# Patient Record
Sex: FEMALE | Race: WHITE
[De-identification: names, ages, dates, MRNs, and addresses within clinical notes are randomized per-mention and may not be internally consistent; named-entity substitution may affect disease eponyms.]

---

## 2017-12-17 ENCOUNTER — HOSPITAL ENCOUNTER (EMERGENCY)
Dept: HOSPITAL 17 - PHED | Age: 32
Discharge: HOME | End: 2017-12-17
Payer: COMMERCIAL

## 2017-12-17 VITALS — BODY MASS INDEX: 42.56 KG/M2 | HEIGHT: 67 IN | WEIGHT: 271.17 LBS

## 2017-12-17 VITALS
TEMPERATURE: 98.2 F | OXYGEN SATURATION: 99 % | SYSTOLIC BLOOD PRESSURE: 136 MMHG | DIASTOLIC BLOOD PRESSURE: 76 MMHG | HEART RATE: 106 BPM | RESPIRATION RATE: 16 BRPM

## 2017-12-17 VITALS — RESPIRATION RATE: 16 BRPM | OXYGEN SATURATION: 98 % | HEART RATE: 93 BPM

## 2017-12-17 VITALS
DIASTOLIC BLOOD PRESSURE: 75 MMHG | HEART RATE: 97 BPM | RESPIRATION RATE: 20 BRPM | OXYGEN SATURATION: 98 % | SYSTOLIC BLOOD PRESSURE: 138 MMHG

## 2017-12-17 VITALS — SYSTOLIC BLOOD PRESSURE: 141 MMHG | DIASTOLIC BLOOD PRESSURE: 66 MMHG

## 2017-12-17 DIAGNOSIS — F17.200: ICD-10-CM

## 2017-12-17 DIAGNOSIS — D72.829: ICD-10-CM

## 2017-12-17 DIAGNOSIS — K57.32: Primary | ICD-10-CM

## 2017-12-17 DIAGNOSIS — B96.20: ICD-10-CM

## 2017-12-17 DIAGNOSIS — R82.99: ICD-10-CM

## 2017-12-17 LAB
ALBUMIN SERPL-MCNC: 3.1 GM/DL (ref 3.4–5)
ALP SERPL-CCNC: 64 U/L (ref 45–117)
ALT SERPL-CCNC: 26 U/L (ref 10–53)
AST SERPL-CCNC: 19 U/L (ref 15–37)
BACTERIA #/AREA URNS HPF: (no result) /HPF
BASOPHILS # BLD AUTO: 0.4 TH/MM3 (ref 0–0.2)
BASOPHILS NFR BLD: 3.5 % (ref 0–2)
BILIRUB SERPL-MCNC: 0.5 MG/DL (ref 0.2–1)
BUN SERPL-MCNC: 12 MG/DL (ref 7–18)
CALCIUM SERPL-MCNC: 7.8 MG/DL (ref 8.5–10.1)
CHLORIDE SERPL-SCNC: 107 MEQ/L (ref 98–107)
COLOR UR: YELLOW
CREAT SERPL-MCNC: 0.97 MG/DL (ref 0.5–1)
EOSINOPHIL # BLD: 0.2 TH/MM3 (ref 0–0.4)
EOSINOPHIL NFR BLD: 1.4 % (ref 0–4)
ERYTHROCYTE [DISTWIDTH] IN BLOOD BY AUTOMATED COUNT: 14.8 % (ref 11.6–17.2)
GFR SERPLBLD BASED ON 1.73 SQ M-ARVRAT: 67 ML/MIN (ref 89–?)
GLUCOSE SERPL-MCNC: 127 MG/DL (ref 74–106)
GLUCOSE UR STRIP-MCNC: (no result) MG/DL
HCO3 BLD-SCNC: 25.8 MEQ/L (ref 21–32)
HCT VFR BLD CALC: 37.9 % (ref 35–46)
HGB BLD-MCNC: 12.9 GM/DL (ref 11.6–15.3)
HGB UR QL STRIP: (no result)
KETONES UR STRIP-MCNC: (no result) MG/DL
LEUKOCYTE ESTERASE UR QL STRIP: (no result) /HPF (ref 0–5)
LIPASE: 127 U/L (ref 73–393)
LYMPHOCYTES # BLD AUTO: 2.9 TH/MM3 (ref 1–4.8)
LYMPHOCYTES NFR BLD AUTO: 24.2 % (ref 9–44)
MCH RBC QN AUTO: 28.5 PG (ref 27–34)
MCHC RBC AUTO-ENTMCNC: 34.1 % (ref 32–36)
MCV RBC AUTO: 83.6 FL (ref 80–100)
MONOCYTE #: 0.8 TH/MM3 (ref 0–0.9)
MONOCYTES NFR BLD: 6.3 % (ref 0–8)
MUCOUS THREADS #/AREA URNS LPF: (no result) /LPF
NEUTROPHILS # BLD AUTO: 7.8 TH/MM3 (ref 1.8–7.7)
NEUTROPHILS NFR BLD AUTO: 64.6 % (ref 16–70)
NITRITE UR QL STRIP: (no result)
PLATELET # BLD: 245 TH/MM3 (ref 150–450)
PMV BLD AUTO: 9.5 FL (ref 7–11)
PROT SERPL-MCNC: 7 GM/DL (ref 6.4–8.2)
RBC # BLD AUTO: 4.53 MIL/MM3 (ref 4–5.3)
SODIUM SERPL-SCNC: 140 MEQ/L (ref 136–145)
SP GR UR STRIP: (no result) (ref 1–1.03)
SQUAMOUS #/AREA URNS HPF: (no result) /HPF (ref 0–5)
URINE LEUKOCYTE ESTERASE: (no result)
WBC # BLD AUTO: 12.1 TH/MM3 (ref 4–11)

## 2017-12-17 PROCEDURE — 83690 ASSAY OF LIPASE: CPT

## 2017-12-17 PROCEDURE — 81001 URINALYSIS AUTO W/SCOPE: CPT

## 2017-12-17 PROCEDURE — 96374 THER/PROPH/DIAG INJ IV PUSH: CPT

## 2017-12-17 PROCEDURE — 96375 TX/PRO/DX INJ NEW DRUG ADDON: CPT

## 2017-12-17 PROCEDURE — 84703 CHORIONIC GONADOTROPIN ASSAY: CPT

## 2017-12-17 PROCEDURE — 74176 CT ABD & PELVIS W/O CONTRAST: CPT

## 2017-12-17 PROCEDURE — 80053 COMPREHEN METABOLIC PANEL: CPT

## 2017-12-17 PROCEDURE — 99285 EMERGENCY DEPT VISIT HI MDM: CPT

## 2017-12-17 PROCEDURE — 87186 SC STD MICRODIL/AGAR DIL: CPT

## 2017-12-17 PROCEDURE — 87077 CULTURE AEROBIC IDENTIFY: CPT

## 2017-12-17 PROCEDURE — 87086 URINE CULTURE/COLONY COUNT: CPT

## 2017-12-17 PROCEDURE — 96361 HYDRATE IV INFUSION ADD-ON: CPT

## 2017-12-17 PROCEDURE — 85025 COMPLETE CBC W/AUTO DIFF WBC: CPT

## 2017-12-17 NOTE — RADRPT
EXAM DATE/TIME:  2017 20:07 

 

[facility] COMPARISON:     

No previous studies available for comparison.

 

 

INDICATIONS :     

Left upper quadrant pain.

                  

 

ORAL CONTRAST:      

No oral contrast ingested.

                  

 

RADIATION DOSE:     

19.31 CTDIvol (mGy) ; Patient motion

 

 

MEDICAL HISTORY :     

None  

 

SURGICAL HISTORY :      

Tubal ligation. Inguinal hernia repair. section.

 

ENCOUNTER:      

Initial

 

ACUITY:      

3 days

 

PAIN SCALE:      

3/10

 

LOCATION:       

Left upper quadrant 

 

TECHNIQUE:     

Volumetric scanning of the abdomen and pelvis was performed.  Using automated exposure control and ad
justment of the mA and/or kV according to patient size, radiation dose was kept as low as reasonably 
achievable to obtain optimal diagnostic quality images.  DICOM format image data is available electro
nically for review and comparison.  

 

FINDINGS:     

 

LOWER LUNGS:     

The visualized lower lungs are clear.

 

LIVER:     

Homogeneous density without lesion.  There is no dilation of the biliary tree.  No calcified gallston
es.

 

SPLEEN:     

Normal size without lesion.

 

PANCREAS:     

Within normal limits. 

 

KIDNEYS:     

Normal in size and shape.  There is no mass, stone, or hydronephrosis.

 

ADRENAL GLANDS:     

Within normal limits.

 

VASCULAR:     

There is no aortic aneurysm.

 

BOWEL/MESENTERY:     

There are colonic diverticula in the sigmoid region and descending colon. There some mild surrounding
 induration in the proximal sigmoid colon concerning for diverticulitis.

 

ABDOMINAL WALL:     

Within normal limits.

 

RETROPERITONEUM:     

There is no lymphadenopathy.

 

BLADDER:     

No wall thickening or mass.

 

REPRODUCTIVE:     

Within normal limits.

 

INGUINAL:     

There is no lymphadenopathy or hernia.

 

MUSCULOSKELETAL:     

Within normal limits for patient age.

 

CONCLUSION:     

Suspected mild diverticulitis at the proximal sigmoid colon in the left lower quadrant.

 

 

 

 Elmer Tejeda MD on 2017 at 20:33           

Board Certified Radiologist.

 This report was verified electronically.

## 2017-12-17 NOTE — PD
HPI


Chief Complaint:  Abdominal Pain


Time Seen by Provider:  18:34


Travel History


International Travel<30 days:  No


Contact w/Intl Traveler<30days:  No


Traveled to known affect area:  No





History of Present Illness


HPI


The patient is a 32-year-old female who presents to the emergency department 

for left upper quadrant and left flank pain that started last night.  The pain 

is located left upper quadrant and radiates left flank, similar to previous 

episodes of diverticulitis.  She does complain of mild nausea without any 

vomiting.  She also complains of diarrhea which she describes as loose, watery, 

without any blood.  She does have a history of diverticulitis, initially 

diagnosed at the age of 20 with similar symptoms, however, she states she 

normally has constipation as opposed to diarrhea with her diverticulitis.  She 

does work at the [employer] and has sick contacts with similar symptoms at her place 

of work.  She denies any dysuria, frequency, or urgency.  She does have a 

history of tubal ligation and  section.  However, she was told that her 

left fallopian tube grew back together after the surgery.  She did have fever 4 

days ago, but denies any fever, chills, or sweats today.  Symptoms are moderate 

without any alleviating or exacerbating factors.





PFSH


Past Medical History


Diminished Hearing:  No


Immunizations Current:  Yes


Pregnant?:  Unknown


LMP:  11/10/17


:  6


Para:  3


Tubal Ligation:  Yes





Past Surgical History


Abdominal Surgery:  Yes (HERNIA REPAIR)


 Section:  Yes (2)





Social History


Alcohol Use:  No


Tobacco Use:  Yes (17 years, pack a day)


Substance Use:  No





Allergies-Medications


(Allergen,Severity, Reaction):  


Coded Allergies:  


     naproxen (Unverified  Allergy, Severe, 17)


Uncoded Allergies:  


     DAYQUIL (Allergy, Intermediate, SWOLLEN, 3/27/13)


     PAMPRIN (Allergy, Intermediate, SWOLLEN, 3/27/13)


Reported Meds & Prescriptions





Reported Meds & Active Scripts


Active


No Active Prescriptions or Reported Medications    








Review of Systems


Except as stated in HPI:  all other systems reviewed are Neg


General / Constitutional:  Positive: Fever (4 days ago, no fever today), No: 

Chills


Cardiovascular:  No: Chest Pain or Discomfort


Respiratory:  No: Shortness of Breath


Gastrointestinal:  Positive: Nausea, Diarrhea, Abdominal Pain, Loss of Appetite

, No: Vomiting, Constipation


Genitourinary:  No: Dysuria





Physical Exam


Narrative


GENERAL: Awake, alert, pleasant 32-year-old female who appears her stated age 

and is in no acute respiratory distress.


SKIN: Focused skin assessment warm/dry.


HEAD: Atraumatic. Normocephalic. 


EYES: Pupils equal and round. No scleral icterus. No injection or drainage. 


ENT: No nasal bleeding or discharge.  Slightly dry mucous membranes.


NECK: Trachea midline. No JVD. 


CARDIOVASCULAR: Regular, tachycardic with a heart rate of 106.


RESPIRATORY: No accessory muscle use. Clear to auscultation. Breath sounds 

equal bilaterally. 


GASTROINTESTINAL: Abdomen soft, tender palpation left upper quadrant and left 

lower quadrant.  No guarding or rigidity.  Negative Edward's.  Negative McBurney

's.


Back: No CVA tenderness.


MUSCULOSKELETAL: No obvious deformities. No clubbing.  No cyanosis.  No edema. 


NEUROLOGICAL: Awake and alert. No obvious cranial nerve deficits.  Motor 

grossly within normal limits. Normal speech.


PSYCHIATRIC: Appropriate mood and affect; insight and judgment normal.





Data


Data


Last Documented VS





Vital Signs








  Date Time  Temp Pulse Resp B/P (MAP) Pulse Ox O2 Delivery O2 Flow Rate FiO2


 


17 19:57  93 16  98 Room Air  


 


17 17:41 98.2   136/76 (96)    








Orders





 Orders


Complete Blood Count With Diff (17 18:41)


Comprehensive Metabolic Panel (17 18:41)


Lipase (17 18:41)


Urinalysis - C+S If Indicated (17 18:41)


Ct Abd/Pel W/O Iv Contrast (17 18:41)


Iv Access Insert/Monitor (17 18:41)


Ecg Monitoring (17 18:41)


Oximetry (17 18:41)


Morphine Inj (Morphine Inj) (17 18:45)


Ondansetron Inj (Zofran Inj) (17 18:45)


Sodium Chlor 0.9% 1000 Ml Inj (Ns 1000 M (17 18:41)


Sodium Chloride 0.9% Flush (Ns Flush) (17 18:45)


Ed Urine Pregnancytest Poc (17 18:41)


Urine Culture (17 19:30)


Ciprofloxacin (Cipro) (17 20:45)


Metronidazole (Flagyl) (17 20:45)


Ed Discharge Order (17 20:44)





Labs





Laboratory Tests








Test


  17


19:30 17


19:40


 


Urine Color YELLOW  


 


Urine Turbidity MOD  


 


Urine pH 5.5  


 


Urine Specific Gravity


  GREATER THAN


1.035 


 


 


Urine Protein TRACE mg/dL  


 


Urine Glucose (UA) NEG mg/dL  


 


Urine Ketones TRACE mg/dL  


 


Urine Occult Blood NEG  


 


Urine Nitrite POS  


 


Urine Bilirubin NEG  


 


Urine Leukocyte Esterase NEG  


 


Urine WBC 0-2 /hpf  


 


Urine Squamous Epithelial


Cells 6-8 /hpf 


  


 


 


Urine Calcium Oxalate Crystals MANY /hpf  


 


Urine Bacteria MOD /hpf  


 


Urine Mucus MANY /lpf  


 


Microscopic Urinalysis Comment


  CULTURE


INDICATED 


 


 


White Blood Count  12.1 TH/MM3 


 


Red Blood Count  4.53 MIL/MM3 


 


Hemoglobin  12.9 GM/DL 


 


Hematocrit  37.9 % 


 


Mean Corpuscular Volume  83.6 FL 


 


Mean Corpuscular Hemoglobin  28.5 PG 


 


Mean Corpuscular Hemoglobin


Concent 


  34.1 % 


 


 


Red Cell Distribution Width  14.8 % 


 


Platelet Count  245 TH/MM3 


 


Mean Platelet Volume  9.5 FL 


 


Neutrophils (%) (Auto)  64.6 % 


 


Lymphocytes (%) (Auto)  24.2 % 


 


Monocytes (%) (Auto)  6.3 % 


 


Eosinophils (%) (Auto)  1.4 % 


 


Basophils (%) (Auto)  3.5 % 


 


Neutrophils # (Auto)  7.8 TH/MM3 


 


Lymphocytes # (Auto)  2.9 TH/MM3 


 


Monocytes # (Auto)  0.8 TH/MM3 


 


Eosinophils # (Auto)  0.2 TH/MM3 


 


Basophils # (Auto)  0.4 TH/MM3 


 


CBC Comment  DIFF FINAL 


 


Differential Comment   


 


Blood Urea Nitrogen  12 MG/DL 


 


Creatinine  0.97 MG/DL 


 


Random Glucose  127 MG/DL 


 


Total Protein  7.0 GM/DL 


 


Albumin  3.1 GM/DL 


 


Calcium Level  7.8 MG/DL 


 


Alkaline Phosphatase  64 U/L 


 


Aspartate Amino Transf


(AST/SGOT) 


  19 U/L 


 


 


Alanine Aminotransferase


(ALT/SGPT) 


  26 U/L 


 


 


Total Bilirubin  0.5 MG/DL 


 


Sodium Level  140 MEQ/L 


 


Potassium Level  4.0 MEQ/L 


 


Chloride Level  107 MEQ/L 


 


Carbon Dioxide Level  25.8 MEQ/L 


 


Anion Gap  7 MEQ/L 


 


Estimat Glomerular Filtration


Rate 


  67 ML/MIN 


 


 


Lipase  127 U/L 











[facility]


Medical Decision Making


Medical Screen Exam Complete:  Yes


Emergency Medical Condition:  Yes


Medical Record Reviewed:  Yes


Interpretation(s)





Laboratory Tests








Test


  17


19:30 17


19:40


 


Urine Color YELLOW  


 


Urine Turbidity MOD  


 


Urine pH 5.5  


 


Urine Specific Gravity


  GREATER THAN


1.035 


 


 


Urine Protein TRACE mg/dL  


 


Urine Glucose (UA) NEG mg/dL  


 


Urine Ketones TRACE mg/dL  


 


Urine Occult Blood NEG  


 


Urine Nitrite POS  


 


Urine Bilirubin NEG  


 


Urine Leukocyte Esterase NEG  


 


Urine WBC 0-2 /hpf  


 


Urine Squamous Epithelial


Cells 6-8 /hpf 


  


 


 


Urine Calcium Oxalate Crystals MANY /hpf  


 


Urine Bacteria MOD /hpf  


 


Urine Mucus MANY /lpf  


 


Microscopic Urinalysis Comment


  CULTURE


INDICATED 


 


 


White Blood Count  12.1 TH/MM3 


 


Red Blood Count  4.53 MIL/MM3 


 


Hemoglobin  12.9 GM/DL 


 


Hematocrit  37.9 % 


 


Mean Corpuscular Volume  83.6 FL 


 


Mean Corpuscular Hemoglobin  28.5 PG 


 


Mean Corpuscular Hemoglobin


Concent 


  34.1 % 


 


 


Red Cell Distribution Width  14.8 % 


 


Platelet Count  245 TH/MM3 


 


Mean Platelet Volume  9.5 FL 


 


Neutrophils (%) (Auto)  64.6 % 


 


Lymphocytes (%) (Auto)  24.2 % 


 


Monocytes (%) (Auto)  6.3 % 


 


Eosinophils (%) (Auto)  1.4 % 


 


Basophils (%) (Auto)  3.5 % 


 


Neutrophils # (Auto)  7.8 TH/MM3 


 


Lymphocytes # (Auto)  2.9 TH/MM3 


 


Monocytes # (Auto)  0.8 TH/MM3 


 


Eosinophils # (Auto)  0.2 TH/MM3 


 


Basophils # (Auto)  0.4 TH/MM3 


 


CBC Comment  DIFF FINAL 


 


Differential Comment   


 


Blood Urea Nitrogen  12 MG/DL 


 


Creatinine  0.97 MG/DL 


 


Random Glucose  127 MG/DL 


 


Total Protein  7.0 GM/DL 


 


Albumin  3.1 GM/DL 


 


Calcium Level  7.8 MG/DL 


 


Alkaline Phosphatase  64 U/L 


 


Aspartate Amino Transf


(AST/SGOT) 


  19 U/L 


 


 


Alanine Aminotransferase


(ALT/SGPT) 


  26 U/L 


 


 


Total Bilirubin  0.5 MG/DL 


 


Sodium Level  140 MEQ/L 


 


Potassium Level  4.0 MEQ/L 


 


Chloride Level  107 MEQ/L 


 


Carbon Dioxide Level  25.8 MEQ/L 


 


Anion Gap  7 MEQ/L 


 


Estimat Glomerular Filtration


Rate 


  67 ML/MIN 


 


 


Lipase  127 U/L 








Differential Diagnosis


Differential diagnosis includes diverticulitis, gastroenteritis, pancreatitis, 

colitis, enteritis, dehydration, electrolyte abnormality, pyelonephritis.


Narrative Course


IV was established, labs are drawn and sent, and the patient was placed on 

cardiac telemetry monitoring and continuous pulse oximetry monitoring.  The 

patient was administered morphine, Zofran, and IV fluids.  Bedside UA pregnancy 

test was obtained and UA was sent to lab.  CT of the abdomen and pelvis was 

ordered to evaluate for possible diverticulitis.  Bedside UA pregnancy test was 

negative.  White count was mildly elevated at 12.1.  LFTs and lipase are 

unremarkable.  UA does reveal bacteria but no wbc's.  CT the abdomen and pelvis 

is positive for sigmoid diverticulitis.  Therefore, patient was administered 

Cipro and Flagyl orally.  She will be discharged home on Cipro, Flagyl, and 

Norco.  She will be provided a copy of her CT results and lab results at 

discharge as well as a work excuse for today and tomorrow.  Clear liquid diet 

and advance as tolerated.  Return if symptoms worsen or progress.





Diagnosis





 Primary Impression:  


 Diverticulitis


Patient Instructions:  General Instructions





***Additional Instructions:  


Medications as directed.  Work excuse for today and tomorrow.  Please provide 

the patient a copy of her CT results and lab results at discharge.  Clear 

liquid diet and advance as tolerated.  Follow-up with your primary physician.  

Return if symptoms worsen or progress.


***Med/Other Pt SpecificInfo:  Prescription(s) given


Scripts


Hydrocodone-Acetaminophen (Norco) 5 Mg-325 Mg Tab


1 TAB PO Q6H Y for PAIN, #15 TAB 0 Refills


   Prov: Evangelist Bray MD         17 


Metronidazole (Flagyl) 500 Mg Tab


500 MG PO BID for Infection for 7 Days, #14 TAB 0 Refills


   Prov: Evangelist Bray MD         17 


Ciprofloxacin (Cipro) 500 Mg Tab


500 MG PO BID for Infection for 7 Days, #14 TAB 0 Refills


   Prov: Evangelist Bray MD         17


Disposition:   DISCHARGE HOME


Condition:  Stable











Evangelist Bray MD Dec 17, 2017 19:00

## 2018-02-10 ENCOUNTER — HOSPITAL ENCOUNTER (EMERGENCY)
Dept: HOSPITAL 17 - PHED | Age: 33
Discharge: HOME | End: 2018-02-10
Payer: COMMERCIAL

## 2018-02-10 VITALS
OXYGEN SATURATION: 96 % | SYSTOLIC BLOOD PRESSURE: 137 MMHG | RESPIRATION RATE: 20 BRPM | TEMPERATURE: 98.3 F | HEART RATE: 99 BPM | DIASTOLIC BLOOD PRESSURE: 82 MMHG

## 2018-02-10 VITALS — HEIGHT: 67 IN | WEIGHT: 269.4 LBS | BODY MASS INDEX: 42.28 KG/M2

## 2018-02-10 DIAGNOSIS — Z88.6: ICD-10-CM

## 2018-02-10 DIAGNOSIS — B34.9: Primary | ICD-10-CM

## 2018-02-10 DIAGNOSIS — F17.210: ICD-10-CM

## 2018-02-10 PROCEDURE — 99283 EMERGENCY DEPT VISIT LOW MDM: CPT

## 2018-02-10 PROCEDURE — 87804 INFLUENZA ASSAY W/OPTIC: CPT

## 2018-02-10 NOTE — PD
HPI


Chief Complaint:  Cold / Flu Symptoms


Time Seen by Provider:  22:36


Travel History


International Travel<30 days:  No


Contact w/Intl Traveler<30days:  No


Traveled to known affect area:  No





History of Present Illness


HPI


32-year-old female patient with history of no significant past medical issues, 

presents to the ER with 3 days history of cough, body aches, fatigue, nausea, 

vomiting, sore throat, and congestion.  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  She denies any chest pains, shortness of breath, or 

other symptoms.  She states that the rest her family has had some similar 

symptoms as well.





Modifying Factors: None


Associated Signs & Symptoms: Cough, flulike symptoms, nausea, vomiting, fatigue


Risk Factors: Sick contacts





PFSH


Past Medical History


Diminished Hearing:  No


Diverticulitis:  Yes


Inguinal Hernia:  Yes (RIGHT)


Immunizations Current:  Yes


LMP:  2/10/18


:  6


Para:  3


Tubal Ligation:  Yes





Past Surgical History


Abdominal Surgery:  Yes (HERNIA REPAIR)


 Section:  Yes (2)





Social History


Alcohol Use:  No


Tobacco Use:  Yes (1PPD)


Substance Use:  No





Allergies-Medications


(Allergen,Severity, Reaction):  


Coded Allergies:  


     naproxen (Unverified  Allergy, Severe, 2/10/18)


Uncoded Allergies:  


     DAYQUIL (Allergy, Intermediate, SWOLLEN, 3/27/13)


     PAMPRIN (Allergy, Intermediate, SWOLLEN, 3/27/13)


Reported Meds & Prescriptions





Reported Meds & Active Scripts


Active


No Active Prescriptions or Reported Medications    








Review of Systems


Except as stated in HPI:  all other systems reviewed are Neg





Physical Exam


Narrative


GENERAL: Well-developed young female patient currently in mild distress.  Awake 

and oriented 3.


SKIN: Focused skin assessment warm/dry.


HEAD: Atraumatic. Normocephalic. 


EYES: Pupils equal and round. No scleral icterus. No injection or drainage. 


ENT: No nasal bleeding or discharge.  Mucous membranes pink and moist.  Mild 

pharyngeal erythema without exudates.


NECK: Trachea midline. No JVD.  Supple.


CARDIOVASCULAR: Regular rate and rhythm.  No murmur appreciated.


RESPIRATORY: No accessory muscle use. Clear to auscultation. Breath sounds 

equal bilaterally. 


GASTROINTESTINAL: Abdomen soft, non-tender, nondistended. Hepatic and splenic 

margins not palpable. 


MUSCULOSKELETAL: No obvious deformities. No clubbing.  No cyanosis.  No edema. 


NEUROLOGICAL: Awake and alert. No obvious cranial nerve deficits.  Motor 

grossly within normal limits. Normal speech.


PSYCHIATRIC: Appropriate mood and affect; insight and judgment normal.





Data


Data


Last Documented VS





Vital Signs








  Date Time  Temp Pulse Resp B/P (MAP) Pulse Ox O2 Delivery O2 Flow Rate FiO2


 


2/10/18 22:09 98.3 99 20 137/82 (100) 96   








Orders





 Orders


Influenzae A/B Antigen (2/10/18 22:17)








MDM


Medical Decision Making


Medical Screen Exam Complete:  Yes


Emergency Medical Condition:  Yes


Medical Record Reviewed:  Yes


Differential Diagnosis


Viral syndrome versus influenza versus URI


Narrative Course


Influenza testing is negative.  At this point, patient does have a viral 

syndrome and she will be released with symptomatic relief.  Return for any 

worsening in symptoms as necessary.  The plan has been discussed with her and 

she states understanding.





Diagnosis





 Primary Impression:  


 Viral syndrome


***Med/Other Pt SpecificInfo:  Prescription(s) given


Scripts


Acetaminophen (Tylenol) 325 Mg Tab


650 MG PO Q6H Y for FEVER, #14 TAB 0 Refills


   Prov: Bryan Estrada MD         2/10/18 


Ondansetron Odt (Zofran Odt) 4 Mg Tab


4 MG SL Q6HR Y for Nausea/Vomiting, #3 TAB 0 Refills


   Prov: Bryan Estrada MD         2/10/18


Disposition:  01 DISCHARGE HOME


Condition:  Stable











Bryan Estrada MD Feb 10, 2018 22:38

## 2018-05-01 ENCOUNTER — HOSPITAL ENCOUNTER (EMERGENCY)
Dept: HOSPITAL 17 - PHEFT | Age: 33
Discharge: HOME | End: 2018-05-01
Payer: COMMERCIAL

## 2018-05-01 VITALS
DIASTOLIC BLOOD PRESSURE: 86 MMHG | OXYGEN SATURATION: 98 % | TEMPERATURE: 98.2 F | SYSTOLIC BLOOD PRESSURE: 134 MMHG | HEART RATE: 99 BPM | RESPIRATION RATE: 20 BRPM

## 2018-05-01 VITALS — HEIGHT: 67 IN | BODY MASS INDEX: 44.26 KG/M2 | WEIGHT: 281.97 LBS

## 2018-05-01 DIAGNOSIS — F17.200: ICD-10-CM

## 2018-05-01 DIAGNOSIS — Z88.8: ICD-10-CM

## 2018-05-01 DIAGNOSIS — J40: Primary | ICD-10-CM

## 2018-05-01 DIAGNOSIS — Z87.19: ICD-10-CM

## 2018-05-01 DIAGNOSIS — Z79.51: ICD-10-CM

## 2018-05-01 DIAGNOSIS — Z79.899: ICD-10-CM

## 2018-05-01 PROCEDURE — 99283 EMERGENCY DEPT VISIT LOW MDM: CPT

## 2018-05-01 NOTE — PD
HPI


Chief Complaint:  Cold / Flu Symptoms


Time Seen by Provider:  20:20


Travel History


International Travel<30 days:  No


Contact w/Intl Traveler<30days:  No


Traveled to known affect area:  No





History of Present Illness


HPI


Patient comes to the emergency department complaint cough, congestion, and sore 

throat ongoing for 3 weeks.  Patient reports subjective fever 2 days ago.  

Patient reports using over-the-counter cold flu medication as well as Motrin 

for symptomatic relief with little to no improvement of symptoms.  Patient 

reports people at her work are sick with similar symptoms.  Patient reports 

cough is occasionally productive with greenish phlegm.  Denies any chest pain, 

shortness of breath, nausea, vomiting, abdominal pain, loss change in bowel or 

bladder, or pregnancy.  Denies anything making symptoms worse.  Severity mild.





PFSH


Past Medical History


Diminished Hearing:  No


Diverticulitis:  Yes


Inguinal Hernia:  Yes (RIGHT)


Immunizations Current:  Yes


Tetanus Vaccination:  Unknown


Pregnant?:  Not Pregnant


LMP:  18


:  6


Para:  3


Tubal Ligation:  Yes





Past Surgical History


Abdominal Surgery:  Yes (RIGHT INGUINAL HERNIA REPAIR WITH MESH)


 Section:  Yes (2)





Social History


Alcohol Use:  No


Tobacco Use:  Yes (1PPD)


Substance Use:  No





Allergies-Medications


(Allergen,Severity, Reaction):  


Coded Allergies:  


     naproxen (Unverified  Allergy, Severe, 2/10/18)


Uncoded Allergies:  


     DAYQUIL (Allergy, Intermediate, SWOLLEN, 3/27/13)


     PAMPRIN (Allergy, Intermediate, SWOLLEN, 3/27/13)


Reported Meds & Prescriptions





Reported Meds & Active Scripts


Active


Ventolin Hfa 18 GM Inh (Albuterol Sulfate) 90 Mcg/Act Aer 2 Puff INH Q4H PRN


Medrol Dosepak (Methylprednisolone) 4 Mg Dspk 4 Mg PO AS DIRECTED


     Per Pharmacist direction


Zithromax Z-Viral (Azithromycin) 250 Mg Dspk 250 Mg PO AS DIRECTED


     500 MG (2 tabs) day 1, then 1 tab days 2-5.


Tylenol (Acetaminophen) 325 Mg Tab 650 Mg PO Q6H PRN


Zofran Odt (Ondansetron Odt) 4 Mg Tab 4 Mg SL Q6HR PRN








Review of Systems


Except as stated in HPI:  all other systems reviewed are Neg





Physical Exam


Narrative


GENERAL: Well-developed, overly nourished, in no acute distress, and non-ill 

appearing.


SKIN: Focused skin assessment warm and dry.


HEAD: Atraumatic. Normocephalic. 


EYES: Pupils equal and round. EOMI. No scleral icterus. No injection or 

drainage. 


ENT: No nasal bleeding or discharge.  Mucous membranes pink and moist.  

Tympanic membranes pearly gray bilaterally.  Posterior pharynx nonerythematous 

without exudate.  Uvula is midline.  No tenderness to facial sinuses to 

palpation.


NECK: Trachea midline. No cervical lymphadenopathy. Supple.  No nuclear 

rigidity.


CARDIOVASCULAR: Regular rate and rhythm.  No murmur appreciated.


RESPIRATORY: No accessory muscle use.  No respiratory distress. Clear to 

auscultation. Breath sounds equal bilaterally.  Dry cough noted on exam.  

Patient speaking in full sentences without difficulty.


MUSCULOSKELETAL: No obvious deformities. No clubbing.  No cyanosis.  No edema.  

Full range of motion.


NEUROLOGICAL: Awake and alert. No obvious cranial nerve deficits.  Motor 

grossly within normal limits. Normal speech.


PSYCHIATRIC: Appropriate mood and affect; insight and judgment normal.





Data


Data


Last Documented VS





Vital Signs








  Date Time  Temp Pulse Resp B/P (MAP) Pulse Ox O2 Delivery O2 Flow Rate FiO2


 


18 20:07 98.2 99 20 134/86 (102) 98   











MDM


Medical Decision Making


Medical Screen Exam Complete:  Yes


Emergency Medical Condition:  Yes


Differential Diagnosis


URI, bronchitis, pneumonia, sinusitis, strep pharyngitis, viral pharyngitis, 

allergies, viral syndrome


Narrative Course


Patients symptom complex is consistent with bronchitis. The patient is non-ill 

appearing and is in no respiratory distress and comfortable. The patient moves 

air well and oxygen saturations are normal. There is no clinical evidence to 

suggest pneumonia at this time. Plan of care and management were discussed with 

the patient who agreed with plan. The patient was instructed to follow up with 

their physician and instructed to return if worsens, progressively worsening 

shortness of breath or difficulty breathing, persistent fever, chest pains or 

discomfort, inability to keep medication or fluids down with or without vomiting

, or as needed.





Patient in no obvious distress upon re-evaluation. Patient was asked if they 

wanted to speak to my attending, which the patient did not wish to do at this 

time.  Any questions/concerns in reference to patient diagnosis/condition 

discussed and clarified prior to patient's discharge. Reinforced sheer 

importance of close follow up with patient's primary physician or primary care 

clinic. Instructed patient to return to ED immediately, if symptoms return/

worsen. Patient showed understanding of above instructions.  Further 

instructions and recommendations were detailed in discharge paperwork.  Patient 

ambulated without difficulty out of ED at discharge.





Diagnosis





 Primary Impression:  


 Bronchitis


Referrals:  


[facility]


Patient Instructions:  Acute Bronchitis (ED), General Instructions





***Additional Instructions:  


Follow-up with your primary care physician in 3-5 days for reevaluation.  Take 

all medication as prescribed.  Drink plenty of non-caffeinated nonalcoholic 

fluids.  Return to the emergency department if symptoms get worse.


***Med/Other Pt SpecificInfo:  Prescription(s) given


Scripts


Albuterol 18 GM Inh (Ventolin Hfa 18 GM Inh) 90 Mcg/Act Aer


2 PUFF INH Q4H Y for SHORTNESS OF BREATH, #1 INHALER 0 Refills


   Prov: Tiffany Graf MD         18 


Methylprednisolone Dosepak (Medrol Dosepak) 4 Mg Dspk


4 MG PO AS DIRECTED, #1 DSPK 0 Refills


   Per Pharmacist direction


   Prov: Tiffany Graf MD         18 


Azithromycin (Zithromax Z-Viral) 250 Mg Dspk


250 MG PO AS DIRECTED for Infection, #1 DSPK 0 Refills


   500 MG (2 tabs) day 1, then 1 tab days 2-5.


   Prov: Tiffany Graf MD         18


Disposition:  01 DISCHARGE HOME


Condition:  Stable











Kana Leon May 1, 2018 20:27